# Patient Record
Sex: MALE | Race: OTHER | URBAN - METROPOLITAN AREA
[De-identification: names, ages, dates, MRNs, and addresses within clinical notes are randomized per-mention and may not be internally consistent; named-entity substitution may affect disease eponyms.]

---

## 2020-10-15 ENCOUNTER — EMERGENCY (EMERGENCY)
Facility: HOSPITAL | Age: 35
LOS: 0 days | Discharge: HOME | End: 2020-10-15
Attending: EMERGENCY MEDICINE
Payer: SELF-PAY

## 2020-10-15 VITALS
HEART RATE: 83 BPM | DIASTOLIC BLOOD PRESSURE: 98 MMHG | RESPIRATION RATE: 18 BRPM | OXYGEN SATURATION: 97 % | TEMPERATURE: 98 F | SYSTOLIC BLOOD PRESSURE: 139 MMHG

## 2020-10-15 VITALS — WEIGHT: 164.91 LBS | HEIGHT: 64 IN
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
## 2020-10-15 DIAGNOSIS — T16.2XXA FOREIGN BODY IN LEFT EAR, INITIAL ENCOUNTER: ICD-10-CM
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
## 2020-10-15 PROCEDURE — 99283 EMERGENCY DEPT VISIT LOW MDM: CPT

## 2020-10-15 PROCEDURE — 69200 CLEAR OUTER EAR CANAL: CPT

## 2020-10-15 PROCEDURE — 99283 EMERGENCY DEPT VISIT LOW MDM: CPT | Mod: 25

## 2020-10-15 PROCEDURE — 69200 CLEAR OUTER EAR CANAL: CPT | Mod: LT

## 2020-10-15 RX ORDER — ACETAMINOPHEN 500 MG
650 TABLET ORAL ONCE
Refills: 0 | Status: COMPLETED | OUTPATIENT
Start: 2020-10-15 | End: 2020-10-15

## 2020-10-15 RX ADMIN — Medication 650 MILLIGRAM(S): at 11:55

## 2020-10-15 NOTE — ED STATDOCS - OBJECTIVE STATEMENT
36 y/o male with no pertinent PMHx presents to ED sent in by [facility] c/o foreign body in left ear. Pt denies any other complaints currently. 34 y/o male with no pertinent PMHx presents to ED sent in by [facility] c/o foreign body in left ear x1 week. Pt has the end of Q-tip stuck in left ear. Pt denies any other complaints currently.

## 2020-10-15 NOTE — ED STATDOCS - CLINICAL SUMMARY MEDICAL DECISION MAKING FREE TEXT BOX
Pt lodged Qtip in ear x1 week ago, unable to remove, able to extract with forceps without difficulty, pt hearing restored, discharge home with return precautions, no evidence of perforation on re-exam. Pt lodged Qtip in ear x1 week ago, unable to remove, able to extract with forceps without difficulty here, pt hearing restored, discharge home with return precautions, no evidence of perforation on re-exam.

## 2020-10-15 NOTE — ED STATDOCS - PROGRESS NOTE DETAILS
Patient seen and evaluated in intake with ED Attending,  ED attending note and orders reviewed, will continue with patient follow up and care -Fozia Haines PA-C

## 2020-10-15 NOTE — ED ADULT TRIAGE NOTE - CHIEF COMPLAINT QUOTE
pt a/ox3, sent by [facility] for removal of foreign body in left ear.

## 2020-10-15 NOTE — ED STATDOCS - NSFOLLOWUPINSTRUCTIONS_ED_ALL_ED_FT
Log Out.      [employer] CareNotes®     :  [facility]  	                       EAR FOREIGN BODY - AfterCare(R) Instructions(ER/ED)           Ear Foreign Body    WHAT YOU NEED TO KNOW:    An ear foreign body is an object that is stuck in your ear. Foreign bodies are usually trapped in the outer ear canal. This is the tube from the opening of your ear to your eardrum.    DISCHARGE INSTRUCTIONS:    Call your doctor if:   •You have severe ear pain.      •You have pus or blood draining from your ear.      •You have a fever or chills.      •You have trouble hearing, or you have ringing in your ears.      •You have questions or concerns about your condition or care.      Medicines:   •Medicines may be give to decrease pain, inflammation, or treat an infection.      •Take your medicine as directed. Contact your healthcare provider if you think your medicine is not helping or if you have side effects. Tell him of her if you are allergic to any medicine. Keep a list of the medicines, vitamins, and herbs you take. Include the amounts, and when and why you take them. Bring the list or the pill bottles to follow-up visits. Carry your medicine list with you in case of an emergency.      Follow up with your healthcare provider as directed: Write down your questions so you remember to ask them during your visits.

## 2020-10-15 NOTE — ED ADULT NURSE NOTE - OBJECTIVE STATEMENT
patient from [facility] complaining of foreign body to left ear x1 week. seen and evaluated by MD. denies pain or any other symptoms

## 2020-10-15 NOTE — ED STATDOCS - ENMT, MLM
Nasal mucosa clear.  Mouth with normal mucosa  Throat has no vesicles, no oropharyngeal exudates and uvula is midline. Nasal mucosa clear.  Mouth with normal mucosa  Throat has no vesicles, no oropharyngeal exudates and uvula is midline. +cotton tip to external ear canal

## 2020-10-15 NOTE — ED PROCEDURE NOTE - CPROC ED FOREIGN BODY DETAIL1
The area was draped and prepped and the anatomic location of the suspected foreign body was explored in a bloodless field.
total assistance

## 2020-10-15 NOTE — ED STATDOCS - PATIENT PORTAL LINK FT
You can access the FollowMyHealth Patient Portal offered by [facility] by registering at the following website: [email or web address]. By joining [employer]’s FollowMyHealth portal, you will also be able to view your health information using other applications (apps) compatible with our system.

## 2020-10-22 ENCOUNTER — EMERGENCY (EMERGENCY)
Facility: HOSPITAL | Age: 35
LOS: 0 days | Discharge: HOME | End: 2020-10-22
Attending: EMERGENCY MEDICINE
Payer: SELF-PAY

## 2020-10-22 VITALS
OXYGEN SATURATION: 98 % | RESPIRATION RATE: 12 BRPM | DIASTOLIC BLOOD PRESSURE: 85 MMHG | SYSTOLIC BLOOD PRESSURE: 120 MMHG | HEART RATE: 84 BPM | TEMPERATURE: 99 F

## 2020-10-22 VITALS — HEIGHT: 64 IN | WEIGHT: 184.97 LBS
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
## 2020-10-22 DIAGNOSIS — H57.11 OCULAR PAIN, RIGHT EYE: ICD-10-CM

## 2020-10-22 DIAGNOSIS — T15.01XA FOREIGN BODY IN CORNEA, RIGHT EYE, INITIAL ENCOUNTER: ICD-10-CM

## 2020-10-22 PROCEDURE — 99283 EMERGENCY DEPT VISIT LOW MDM: CPT

## 2020-10-22 PROCEDURE — 65222 REMOVE FOREIGN BODY FROM EYE: CPT

## 2020-10-22 PROCEDURE — 65222 REMOVE FOREIGN BODY FROM EYE: CPT | Mod: RT

## 2020-10-22 PROCEDURE — 12001 RPR S/N/AX/GEN/TRNK 2.5CM/<: CPT

## 2020-10-22 PROCEDURE — 99283 EMERGENCY DEPT VISIT LOW MDM: CPT | Mod: 25

## 2020-10-22 PROCEDURE — 90471 IMMUNIZATION ADMIN: CPT

## 2020-10-22 RX ORDER — CIPROFLOXACIN HCL 0.3 %
2 DROPS OPHTHALMIC (EYE)
Qty: 1 | Refills: 0
Start: 2020-10-22 | End: 2020-10-28

## 2020-10-22 RX ORDER — ERYTHROMYCIN BASE 5 MG/GRAM
1 OINTMENT (GRAM) OPHTHALMIC (EYE)
Qty: 1 | Refills: 0
Start: 2020-10-22 | End: 2020-10-28

## 2020-10-22 RX ORDER — IBUPROFEN 200 MG
600 TABLET ORAL ONCE
Refills: 0 | Status: COMPLETED | OUTPATIENT
Start: 2020-10-22 | End: 2020-10-22

## 2020-10-22 RX ORDER — CIPROFLOXACIN HCL 0.3 %
1 DROPS OPHTHALMIC (EYE) ONCE
Refills: 0 | Status: COMPLETED | OUTPATIENT
Start: 2020-10-22 | End: 2020-10-22

## 2020-10-22 RX ADMIN — Medication 1 DROP(S): at 21:32

## 2020-10-22 RX ADMIN — Medication 600 MILLIGRAM(S): at 21:32

## 2020-10-22 RX ADMIN — Medication 2 DROP(S): at 21:32

## 2020-10-22 NOTE — ED STATDOCS - CLINICAL SUMMARY MEDICAL DECISION MAKING FREE TEXT BOX
metal corneal FB removed with some residual rust. abx ppx. f/u optho tomorrow. return precautions given. Pt feeling well at DC, agrees with DC and plan of care.

## 2020-10-22 NOTE — ED PROCEDURE NOTE - PROCEDURE ADDITIONAL DETAILS
applied, cotton tip applicator used to remove metal sliver from R eye. 2 attempts, foreign body removed, repeat lamp exam reveal rust stain and circular corneal abrasion at site of removal of FB

## 2020-10-22 NOTE — ED STATDOCS - PROGRESS NOTE DETAILS
signed Neyda Euceda PA-C Pt seen in intake initially by Dr Colin.  ID [id number]  35M c/o pain in right eye when he accidentally rubbed something into his eye this afternoon. No glasses or contact use. Tetanus up to date. Pt with tiny metal FB in left cornea with small rust ring, negative jason on slit lamp exam. FB removed by Dr Colin with 30ga needle, pt tolerated procedure well. Repeat slit lamp exam no FB seen. Will treat for corneal abrasion. Outpt f/u optho tomorrow for rust removal. return precautions given. OTC motrin, pt declined percocet rx. cipro gtt during day, erythromycin ointment at night. Pt feeling well at ND, agrees with ND and plan of care. visual acuity 20/30 bilat at ND.

## 2020-10-22 NOTE — ED STATDOCS - PATIENT PORTAL LINK FT
You can access the FollowMyHealth Patient Portal offered by [facility] by registering at the following website: [email or web address]. By joining [employer]’s FollowMyHealth portal, you will also be able to view your health information using other applications (apps) compatible with our system.

## 2020-10-22 NOTE — ED ADULT TRIAGE NOTE - CHIEF COMPLAINT QUOTE
pt presents to the ED complaining of right eye irritation, pt states he believes a piece of metal got in his eye while he was at work 5-6 hours ago

## 2020-10-22 NOTE — ED ADULT NURSE NOTE - OBJECTIVE STATEMENT
Pt. is a 36 yo M who wears eyeglasses daily but denies contact lens use presents with right eye pain and suspicion of something in his right eye.  Patient was working at home cutting wood and metal and about 5.5 hours ago got something in his right eye.  He states he was wearing eye protection but it must have got in through the side of the goggles.  He denies tearing or trouble seeing, but sees something in his eye in the mirror.  Patient is UTD on Tdap, believes he had a booster shot for Tdap about 2 years ago.  He came to the ED for evaluation and foreign body removal. Visual acuity 20/30 BL eyes

## 2020-10-22 NOTE — ED STATDOCS - NSFOLLOWUPINSTRUCTIONS_ED_ALL_ED_FT
Cuerpo extraño en el janine    Eye Foreign Body       Un cuerpo extraño es un objeto en el janine que no debería estar allí. Podría ser dash partícula de suciedad o polvo, un pelo, dash pestaña, dash astilla o cualquier otro objeto. Se puede encontrar fuera del globo ocular (extraocular) o dentro del globo ocular. Si el objeto se encuentra fuera del globo ocular, generalmente se puede quitar con agua o lo extrae un médico. Un objeto dentro del globo ocular es dash emergencia médica que se debe tratar con cirugía.      Siga estas indicaciones en mendoza casa:    •[place] los medicamentos de venta ruddy y los recetados solamente mary kate se lo haya indicado el médico. Use las gotas oftálmicas o el ungüento mary kate se le haya indicado.      •Si le recetaron gotas o ungüentos con antibiótico, úselos según las indicaciones del médico. No deje de usarlos aunque comience a sentirse mejor.    •En natacha de que tenga un vendaje sobre el janine (parche ocular):  •Úselo mary kate se lo hayan indicado. Siga las indicaciones del médico respecto de cuándo retirarlo.      •No conduzca ni use maquinaria pesada mientras usa el vendaje.      •Si no tiene un vendaje sobre el janine:  •Mantenga el janine cerrado la mayor cantidad de tiempo posible.      •No se frote el janine.      •Use anteojos oscuros cuando esté expuesto a georgia brillante.      • No use lentes de contacto hasta que sienta el janine con normalidad o según se lo haya indicado el médico.      •Si hace actividades con alto riesgo de lesiones oculares, mary kate el uso de herramientas de buzz velocidad, use un parche protector.        •Concurra a todas las visitas de control mary kate se lo haya indicado el médico. [place] es importante.        Comuníquese con un médico si:    •Siente más dolor en el janine.      •Tiene problemas con el vendaje del janine.      •Le sale un líquido (secreción) del janine que no es normal.        Solicite ayuda de inmediato si:    •Mendoza capacidad para kay (visión) empeora.      •Tiene más enrojecimiento e hinchazón alrededor del janine.        Resumen    •Un cuerpo extraño es un objeto en el janine que no debería estar allí.      •Un objeto fuera del globo ocular, generalmente se puede quitar con agua o lo extrae un médico. Un objeto dentro del globo ocular es dash emergencia.      •Si tiene un vendaje sobre el janine (parche ocular), no conduzca mientras lo use.      •Si tiene más dolor en el janine, comuníquese con mendoza médico.      Esta información no tiene mary kate fin reemplazar el consejo del médico. Asegúrese de hacerle al médico cualquier pregunta que tenga.        SEGUIMIENTO CON EL MÉDICO DE OJOS DE LA CLÍNICA MAÑANA. LLAME AL NÚMERO PARA HACER DASH THAI. REGRESE A LA ER PARA CUALQUIER SÍNTOMA PENDIENTE O NUEVAS PREOCUPACIONES.

## 2020-10-22 NOTE — ED STATDOCS - ATTENDING CONTRIBUTION TO CARE
Attending Contribution to Care: I, Nathaly Colin, performed the initial face to face bedside interview with this patient regarding history of present illness, review of symptoms and relevant past medical, social and family history.  I completed an independent physical examination.  I was the initial provider who evaluated this patient. I have signed out the follow up of any pending tests (i.e. labs, radiological studies) to the ACP.  I have communicated the patient’s plan of care and disposition with the ACP.

## 2020-10-22 NOTE — ED STATDOCS - EYES, MLM
EOMI; pupils reactive; Right eye with punctate black particulate matter in cornea over lower portion of iris just beneath the pupil; conjunctivae normal; no drainage; no eyelid swelling or tenderness.

## 2020-10-22 NOTE — ED STATDOCS - OBJECTIVE STATEMENT
Pt. is a 36 yo M who wears eyeglasses daily but denies contact lens use presents with right eye pain and suspicion of something in his right eye.  Patient was working at home cutting wood and metal and about 5.5 hours ago got something in his right eye.  He states he was wearing eye protection but it must have got in through the side of the goggles.  He denies tearing or trouble seeing, but sees something in his eye in the mirror.  Patient is UTD on Tdap, believes he had a booster shot for Tdap about 2 years ago.  He came to the ED for evaluation and foreign body removal.

## 2020-10-22 NOTE — ED STATDOCS - NSFOLLOWUPCLINICS_GEN_ALL_ED_FT
[facility] - Ophthalmology  Ophthalmology  [address], [address]  [place] [address]  Phone: [phone]  Fax:     [facility] Ophthalmology  Ophthalmology  [address], [address]  [place] [address]  Phone: [phone]  Fax:   Follow Up Time:

## 2020-10-23 PROBLEM — Z78.9 OTHER SPECIFIED HEALTH STATUS: Chronic | Status: ACTIVE | Noted: 2020-10-15

## 2021-01-12 ENCOUNTER — OUTPATIENT (OUTPATIENT)
Dept: OUTPATIENT SERVICES | Facility: HOSPITAL | Age: 36
LOS: 1 days | End: 2021-01-12
Payer: MEDICARE

## 2021-01-12 DIAGNOSIS — Z20.828 CONTACT WITH AND (SUSPECTED) EXPOSURE TO OTHER VIRAL COMMUNICABLE DISEASES: ICD-10-CM

## 2021-01-12 LAB — SARS-COV-2 RNA SPEC QL NAA+PROBE: SIGNIFICANT CHANGE UP

## 2021-01-12 PROCEDURE — U0003: CPT

## 2021-01-12 PROCEDURE — U0005: CPT

## 2021-01-12 PROCEDURE — C9803: CPT

## 2021-01-13 DIAGNOSIS — Z20.828 CONTACT WITH AND (SUSPECTED) EXPOSURE TO OTHER VIRAL COMMUNICABLE DISEASES: ICD-10-CM

## 2021-12-30 ENCOUNTER — OUTPATIENT (OUTPATIENT)
Dept: OUTPATIENT SERVICES | Facility: HOSPITAL | Age: 36
LOS: 1 days | End: 2021-12-30
Payer: MEDICARE

## 2021-12-30 DIAGNOSIS — Z20.828 CONTACT WITH AND (SUSPECTED) EXPOSURE TO OTHER VIRAL COMMUNICABLE DISEASES: ICD-10-CM

## 2021-12-30 PROCEDURE — U0003: CPT

## 2021-12-30 PROCEDURE — C9803: CPT

## 2021-12-30 PROCEDURE — U0005: CPT

## 2021-12-31 DIAGNOSIS — Z20.828 CONTACT WITH AND (SUSPECTED) EXPOSURE TO OTHER VIRAL COMMUNICABLE DISEASES: ICD-10-CM

## 2021-12-31 LAB — SARS-COV-2 RNA SPEC QL NAA+PROBE: DETECTED

## 2022-01-02 ENCOUNTER — OUTPATIENT (OUTPATIENT)
Dept: OUTPATIENT SERVICES | Facility: HOSPITAL | Age: 37
LOS: 1 days | End: 2022-01-02
Payer: MEDICARE

## 2022-01-02 DIAGNOSIS — Z20.828 CONTACT WITH AND (SUSPECTED) EXPOSURE TO OTHER VIRAL COMMUNICABLE DISEASES: ICD-10-CM

## 2022-01-02 LAB — SARS-COV-2 RNA SPEC QL NAA+PROBE: DETECTED

## 2022-01-02 PROCEDURE — U0005: CPT

## 2022-01-02 PROCEDURE — U0003: CPT

## 2022-01-02 PROCEDURE — C9803: CPT

## 2022-01-03 DIAGNOSIS — Z20.828 CONTACT WITH AND (SUSPECTED) EXPOSURE TO OTHER VIRAL COMMUNICABLE DISEASES: ICD-10-CM

## 2023-04-14 ENCOUNTER — EMERGENCY (EMERGENCY)
Facility: HOSPITAL | Age: 38
LOS: 0 days | Discharge: HOME | End: 2023-04-14
Attending: STUDENT IN AN ORGANIZED HEALTH CARE EDUCATION/TRAINING PROGRAM
Payer: MEDICAID

## 2023-04-14 VITALS
SYSTOLIC BLOOD PRESSURE: 117 MMHG | HEART RATE: 99 BPM | RESPIRATION RATE: 18 BRPM | OXYGEN SATURATION: 98 % | TEMPERATURE: 98 F | DIASTOLIC BLOOD PRESSURE: 95 MMHG

## 2023-04-14 VITALS — WEIGHT: 179.9 LBS | HEIGHT: 62 IN

## 2023-04-14 LAB
ALBUMIN SERPL ELPH-MCNC: 3.8 G/DL — SIGNIFICANT CHANGE UP (ref 3.3–5)
ALP SERPL-CCNC: 104 U/L — SIGNIFICANT CHANGE UP (ref 40–120)
ALT FLD-CCNC: 47 U/L — SIGNIFICANT CHANGE UP (ref 12–78)
ANION GAP SERPL CALC-SCNC: 2 MMOL/L — LOW (ref 5–17)
AST SERPL-CCNC: 32 U/L — SIGNIFICANT CHANGE UP (ref 15–37)
BASE EXCESS BLDV CALC-SCNC: 3.2 MMOL/L — SIGNIFICANT CHANGE UP
BASOPHILS # BLD AUTO: 0 K/UL — SIGNIFICANT CHANGE UP (ref 0–0.2)
BASOPHILS NFR BLD AUTO: 0 % — SIGNIFICANT CHANGE UP (ref 0–2)
BILIRUB SERPL-MCNC: 0.3 MG/DL — SIGNIFICANT CHANGE UP (ref 0.2–1.2)
BUN SERPL-MCNC: 12 MG/DL — SIGNIFICANT CHANGE UP (ref 7–23)
CALCIUM SERPL-MCNC: 9.2 MG/DL — SIGNIFICANT CHANGE UP (ref 8.5–10.1)
CHLORIDE SERPL-SCNC: 103 MMOL/L — SIGNIFICANT CHANGE UP (ref 96–108)
CO2 BLDV-SCNC: 32 MMOL/L — HIGH (ref 22–26)
CO2 SERPL-SCNC: 29 MMOL/L — SIGNIFICANT CHANGE UP (ref 22–31)
CREAT SERPL-MCNC: 0.85 MG/DL — SIGNIFICANT CHANGE UP (ref 0.5–1.3)
EGFR: 115 ML/MIN/1.73M2 — SIGNIFICANT CHANGE UP
EOSINOPHIL # BLD AUTO: 0.22 K/UL — SIGNIFICANT CHANGE UP (ref 0–0.5)
EOSINOPHIL NFR BLD AUTO: 4 % — SIGNIFICANT CHANGE UP (ref 0–6)
FLUAV AG NPH QL: SIGNIFICANT CHANGE UP
FLUBV AG NPH QL: DETECTED
GAS PNL BLDV: SIGNIFICANT CHANGE UP
GLUCOSE SERPL-MCNC: 202 MG/DL — HIGH (ref 70–99)
HCO3 BLDV-SCNC: 30 MMOL/L — HIGH (ref 22–29)
HCT VFR BLD CALC: 45.6 % — SIGNIFICANT CHANGE UP (ref 39–50)
HGB BLD-MCNC: 15.4 G/DL — SIGNIFICANT CHANGE UP (ref 13–17)
LYMPHOCYTES # BLD AUTO: 2.58 K/UL — SIGNIFICANT CHANGE UP (ref 1–3.3)
LYMPHOCYTES # BLD AUTO: 47 % — HIGH (ref 13–44)
MANUAL SMEAR VERIFICATION: SIGNIFICANT CHANGE UP
MCHC RBC-ENTMCNC: 29.8 PG — SIGNIFICANT CHANGE UP (ref 27–34)
MCHC RBC-ENTMCNC: 33.8 GM/DL — SIGNIFICANT CHANGE UP (ref 32–36)
MCV RBC AUTO: 88.4 FL — SIGNIFICANT CHANGE UP (ref 80–100)
METAMYELOCYTES # FLD: 1 % — HIGH (ref 0–0)
MONOCYTES # BLD AUTO: 0.27 K/UL — SIGNIFICANT CHANGE UP (ref 0–0.9)
MONOCYTES NFR BLD AUTO: 5 % — SIGNIFICANT CHANGE UP (ref 2–14)
NEUTROPHILS # BLD AUTO: 2.25 K/UL — SIGNIFICANT CHANGE UP (ref 1.8–7.4)
NEUTROPHILS NFR BLD AUTO: 40 % — LOW (ref 43–77)
NEUTS BAND # BLD: 1 % — SIGNIFICANT CHANGE UP (ref 0–8)
NRBC # BLD: 0 /100 — SIGNIFICANT CHANGE UP (ref 0–0)
NRBC # BLD: SIGNIFICANT CHANGE UP /100 WBCS (ref 0–0)
PCO2 BLDV: 53 MMHG — SIGNIFICANT CHANGE UP (ref 42–55)
PH BLDV: 7.36 — SIGNIFICANT CHANGE UP (ref 7.32–7.43)
PLAT MORPH BLD: NORMAL — SIGNIFICANT CHANGE UP
PLATELET # BLD AUTO: 182 K/UL — SIGNIFICANT CHANGE UP (ref 150–400)
PO2 BLDV: 51 MMHG — SIGNIFICANT CHANGE UP
POTASSIUM SERPL-MCNC: 3.7 MMOL/L — SIGNIFICANT CHANGE UP (ref 3.5–5.3)
POTASSIUM SERPL-SCNC: 3.7 MMOL/L — SIGNIFICANT CHANGE UP (ref 3.5–5.3)
PROT SERPL-MCNC: 8.6 GM/DL — HIGH (ref 6–8.3)
RBC # BLD: 5.16 M/UL — SIGNIFICANT CHANGE UP (ref 4.2–5.8)
RBC # FLD: 11.6 % — SIGNIFICANT CHANGE UP (ref 10.3–14.5)
RBC BLD AUTO: NORMAL — SIGNIFICANT CHANGE UP
RSV RNA NPH QL NAA+NON-PROBE: SIGNIFICANT CHANGE UP
SAO2 % BLDV: 85 % — SIGNIFICANT CHANGE UP
SARS-COV-2 RNA SPEC QL NAA+PROBE: SIGNIFICANT CHANGE UP
SODIUM SERPL-SCNC: 134 MMOL/L — LOW (ref 135–145)
VARIANT LYMPHS # BLD: 2 % — SIGNIFICANT CHANGE UP (ref 0–6)
WBC # BLD: 5.48 K/UL — SIGNIFICANT CHANGE UP (ref 3.8–10.5)
WBC # FLD AUTO: 5.48 K/UL — SIGNIFICANT CHANGE UP (ref 3.8–10.5)

## 2023-04-14 PROCEDURE — 82803 BLOOD GASES ANY COMBINATION: CPT

## 2023-04-14 PROCEDURE — 0241U: CPT

## 2023-04-14 PROCEDURE — 80053 COMPREHEN METABOLIC PANEL: CPT

## 2023-04-14 PROCEDURE — 36415 COLL VENOUS BLD VENIPUNCTURE: CPT

## 2023-04-14 PROCEDURE — 85025 COMPLETE CBC W/AUTO DIFF WBC: CPT

## 2023-04-14 PROCEDURE — 82962 GLUCOSE BLOOD TEST: CPT

## 2023-04-14 PROCEDURE — 99283 EMERGENCY DEPT VISIT LOW MDM: CPT

## 2023-04-14 PROCEDURE — 99284 EMERGENCY DEPT VISIT MOD MDM: CPT

## 2023-04-14 RX ORDER — SODIUM CHLORIDE 9 MG/ML
1000 INJECTION INTRAMUSCULAR; INTRAVENOUS; SUBCUTANEOUS ONCE
Refills: 0 | Status: COMPLETED | OUTPATIENT
Start: 2023-04-14 | End: 2023-04-14

## 2023-04-14 RX ADMIN — SODIUM CHLORIDE 1000 MILLILITER(S): 9 INJECTION INTRAMUSCULAR; INTRAVENOUS; SUBCUTANEOUS at 15:26

## 2023-04-14 NOTE — ED ADULT NURSE NOTE - CHIEF COMPLAINT QUOTE
Pt presents to ED c/o cold like symptoms. c/o subjective fever, generalized pain, and dizziness. pt also states he was at [facility] and sent in for glucose of 300. hx DM. in triage

## 2023-04-14 NOTE — ED STATDOCS - PATIENT PORTAL LINK FT
You can access the FollowMyHealth Patient Portal offered by [facility] by registering at the following website: [email or web address]. By joining [employer]’s FollowMyHealth portal, you will also be able to view your health information using other applications (apps) compatible with our system.

## 2023-04-14 NOTE — ED ADULT NURSE NOTE - NSFALLRSKOUTCOME_ED_ALL_ED
(1) minimal assist, teach one side; mother does other, staff holds (1) minimal assist, teach one side; mother does other, staff holds Universal Safety Interventions

## 2023-04-14 NOTE — ED ADULT TRIAGE NOTE - CHIEF COMPLAINT QUOTE
Pt presents to ED c/o cold like symptoms. c/o subjective fever, generalized pain, and dizziness. pt also states he was at [facility] and sent in for glucose of 300. hx DM. in triage

## 2023-04-14 NOTE — ED STATDOCS - NS ED ATTENDING STATEMENT MOD
This was a shared visit with the MIAN. I reviewed and verified the documentation and independently performed the documented:

## 2023-04-14 NOTE — ED STATDOCS - CLINICAL SUMMARY MEDICAL DECISION MAKING FREE TEXT BOX
Adult diabetic male sent in for high BGM in setting of URI. Pt appears well, normal exam, normal vitals. Will r/o DKA and d/c w/ PCP follow up if blood work is normal.

## 2023-04-14 NOTE — ED STATDOCS - OBJECTIVE STATEMENT
38 y/o male w/ a PMHx of DM on Metformin presents to the ED c/o cough, fever, abd pain, decreased appetite, and dizziness x5 days. Pt reports he had a BGM of 300 at the [facility]. Pt states he is compliant w/ his Metformin, denies being on Insulin. Pt thinks his Metformin dose was increased today. No other complaints at this time.

## 2023-04-14 NOTE — ED STATDOCS - PROGRESS NOTE DETAILS
pt well appearing and aware of lab results, pt is + flu and knows to take his DM meds as directed by his pmd. pt well appearing on dc and agrees with plan. -Fozia Haines PA-C

## 2023-04-14 NOTE — ED STATDOCS - ATTENDING APP SHARED VISIT CONTRIBUTION OF CARE
I, Conrad Enciso, DO personally saw the patient with MIAN.  I have personally performed a face to face diagnostic evaluation on this patient.  I have reviewed the MIAN note and agree with the history, exam, and plan of care, except as noted.  I personally saw the patient and performed a substantive portion of the visit including all aspects of the medical decision making.

## 2023-04-14 NOTE — ED STATDOCS - NSFOLLOWUPINSTRUCTIONS_ED_ALL_ED_FT
Gripe en los adultos  Influenza, Adult  La gripe, también llamada “influenza”, es dash infección viral que afecta principalmente las vías respiratorias. La Plant incluye los pulmones, la nariz y la garganta. Se transmite fácilmente de persona a persona (es contagiosa). Provoca síntomas del resfrío común, junto con fiebre buzz y dolor corporal.    ¿Cuáles son las causas?  La causa de esta afección es el virus de la influenza. Puede contraer el virus de las siguientes maneras:  Al inhalar las gotitas que están en el aire liberadas por la tos o el estornudo de dash persona infectada.  Tocar algo que tiene el virus (se ha contaminado) y luego tocarse la boca, la nariz o los ojos.  ¿Qué incrementa el riesgo?  Los siguientes factores pueden hacer que sea propenso a contraer la gripe:  No lavarse o desinfectarse las spencer con frecuencia.  Tener contacto cercano con muchas personas maninder la temporada de resfrío y gripe.  Tocarse la boca, los ojos o la nariz sin antes lavarse ni desinfectarse las spencer.  No recibir la vacuna anual contra la gripe.  Puede correr un mayor riesgo de tener gripe, incluso problemas graves, mary kate dash infección pulmonar (neumonía), si:  Es mayor de 65 años de edad.  Está embarazada.  Tiene debilitado el sistema que combate las enfermedades (sistema inmunitario). La Plant incluye a personas que tienen VIH o síndrome de inmunodeficiencia adquirida (SIDA), están recibiendo quimioterapia o están tomando medicamentos que reducen (suprimen) el sistema inmunitario.  Tiene dash enfermedad a liz plazo (crónica), mary kate dash enfermedad cardíaca, enfermedad renal, diabetes o enfermedad pulmonar.  Tiene un trastorno hepático.  Tiene mucho sobrepeso (obesidad mórbida).  Tiene anemia.  Tiene asma.  ¿Cuáles son los signos o síntomas?  Los síntomas de esta afección por lo general comienzan de repente y curiel entre 4 y 14 días. Estos pueden incluir:  Fiebre y escalofríos.  Megan de genie, megan en el cuerpo o megan musculares.  Dolor de garganta.  Tos.  Secreción o congestión nasal.  Malestar en el pecho.  Falta de apetito.  Debilidad o fatiga.  Mareos.  Náuseas o vómitos.  ¿Cómo se diagnostica?  Esta afección se puede diagnosticar en función de lo siguiente:  Los síntomas y los antecedentes médicos.  Un examen físico.  Un hisopado de nariz o garganta y el análisis del líquido extraído para detectar el virus de la gripe.  ¿Cómo se trata?  Si la gripe se diagnostica pronto, se le puede tratar con un medicamento antiviral que se administra por vía oral (por la boca) o por vía intravenosa (i.v.). La Plant puede ayudar a reducir la gravedad de la enfermedad y cuánto dura.    Cuidarse en mendoza hogar también puede ayudar a aliviar los síntomas. El médico puede recomendarle lo siguiente:  Usar medicamentos de venta ruddy.  Beber mucho líquido.  En muchos casos, la gripe desaparece rosangela. Si tiene síntomas graves o complicaciones, puede tratarse en un hospital.    Siga estas instrucciones en mendoza casa:  Actividad    Descanse según sea necesario y duerma josie.  Quédese en mendoza casa y no concurra al trabajo o a la escuela mary kate se lo haya indicado mendoza médico. A menos que visite al médico, evite salir de mendoza casa hasta que la fiebre haya desaparecido por 24 horas sin montana medicamentos.  Comida y bebida    [place] dash solución de rehidratación oral (SRO). Esta es dash bebida que se vende en farmacias y tiendas minoristas.  Cassidy suficiente líquido mary kate para mantener la orina de color amarillo pálido.  En la medida en que pueda, cassidy líquidos transparentes en pequeñas cantidades. Los líquidos transparentes incluyen agua, cubitos de hielo, jugo de fruta rebajado con agua y bebidas deportivas bajas en calorías.  En la medida en que pueda, consuma alimentos blandos y fáciles de digerir en pequeñas cantidades. Estos alimentos incluyen bananas, compota de manzana, arroz, regis magras, tostadas y galletas saladas.  Evite consumir líquidos que contengan mucha azúcar o cafeína, mary kate bebidas energéticas, bebidas deportivas comunes y refrescos.  Evite mont[place] alcohol.  Evite los alimentos condimentados o con alto contenido de grasa.  Indicaciones generales        Use los medicamentos de venta ruddy y los recetados solamente mary kate se lo haya indicado el médico.  Use un humidificador de aire frío para agregar humedad al aire de mendoza casa. La [place] puede facilitar la respiración.  Cuando utilice un humidificador de vapor frío, límpielo a diario. Vacíe el agua y cámbiela por agua limpia.  Al toser o estornudar, cúbrase la boca y la nariz.  Lávese las spencer frecuentemente con agua y jabón y maninder al menos 20 segundos, en especial después de toser o estornudar. Use desinfectante para spencer con alcohol si no dispone de agua y jabón.  Cumpla con todas las visitas de seguimiento. [place] es importante.  ¿Cómo se previene?    Colóquese la vacuna anual contra la gripe. Esta suele estar disponible a finales del verano, en el otoño o en el invierno. Pregúntele al médico cuándo debe colocarse la vacuna contra la gripe.  Evite el contacto con personas que están enfermas maninder la temporada de resfrío y gripe. Generalmente es maninder el otoño y el invierno.  Comuníquese con un médico si:  Tiene nuevos síntomas.  Tiene los siguientes síntomas:  Dolor de pecho.  Diarrea.  Fiebre.  La tos empeora.  Produce más mucosidad.  Siente náuseas o vomita.  Solicite ayuda de inmediato si:  Presenta falta el aire o dificultad para respirar.  La piel o las uñas se ponen de un color azulado.  Presenta dolor intenso o rigidez en el sammy.  Siente un dolor repentino en la genie, la jonnathan o el oído.  No puede comer ni beber sin vomitar.  Estos síntomas pueden representar un problema grave que constituye dash emergencia. No espere a kay si los síntomas desaparecen. Solicite atención médica de inmediato. Comuníquese con el servicio de emergencias de mendoza localidad (911 en los [place]). No conduzca por desmond propios medios hasta el hospital.    Resumen  La gripe, también llamada “influenza”, es dash infección viral que afecta principalmente las vías respiratorias.  Los síntomas de la gripe normalmente comienzan de repente y curiel entre 4 y 14 días.  Colocarse la vacuna anual contra la gripe es la mejor manera de prevenir el contagio de la gripe.  Quédese en mendoza casa y no concurra al trabajo o a la escuela mary kate se lo haya indicado mendoza médico. A menos que visite al médico, evite salir de mendoza casa hasta que la fiebre haya desaparecido por 24 horas sin [place] medicamentos.  Cumpla con todas las visitas de seguimiento. [place] es importante.  Esta información no tiene mary kate fin reemplazar el consejo del médico. Asegúrese de hacerle al médico cualquier pregunta que tenga.

## 2024-02-07 ENCOUNTER — NON-APPOINTMENT (OUTPATIENT)
Age: 39
End: 2024-02-07

## 2024-02-18 ENCOUNTER — NON-APPOINTMENT (OUTPATIENT)
Age: 39
End: 2024-02-18
